# Patient Record
Sex: FEMALE | Race: WHITE
[De-identification: names, ages, dates, MRNs, and addresses within clinical notes are randomized per-mention and may not be internally consistent; named-entity substitution may affect disease eponyms.]

---

## 2020-04-26 ENCOUNTER — HOSPITAL ENCOUNTER (EMERGENCY)
Dept: HOSPITAL 56 - MW.ED | Age: 25
Discharge: HOME | End: 2020-04-26
Payer: COMMERCIAL

## 2020-04-26 VITALS — HEART RATE: 78 BPM | DIASTOLIC BLOOD PRESSURE: 90 MMHG | SYSTOLIC BLOOD PRESSURE: 130 MMHG

## 2020-04-26 DIAGNOSIS — M77.11: Primary | ICD-10-CM

## 2020-04-26 NOTE — EDM.PDOC
ED HPI GENERAL MEDICAL PROBLEM





- General


Chief Complaint: Upper Extremity Injury/Pain


Stated Complaint: RT ARM PAIN/SWELLING


Time Seen by Provider: 04/26/20 14:59


Source of Information: Reports: Patient


History Limitations: Reports: No Limitations





- History of Present Illness


INITIAL COMMENTS - FREE TEXT/NARRATIVE: 


HISTORY AND PHYSICAL:





History of present illness:


Patient is a 24-year-old female who presents to the emergency room with 

complaints of right elbow pain.  She states she started to have some pain just 

below her elbow and would shoot down into her third, fourth and fifth digit 

causing some intermittent tingling sensations.  She states she is concerned as 

she read in the news that COVID-19 cause blood clots (she does not have any 

symptoms of COVID nor exposure).  She would like to be evaluated for a blood 

clot in her elbow.  She denies any hormone replacement therapy, recent travel 

with long periods of sitting, or any noted areas of redness, warmth or soft 

tissue swelling.  She has no known hemological diseases.  Pain is aggravated 

with physical activity.  Patient denies any fever, chills, headache, change in 

vision, syncope or near syncope. Denies any chest pain, back pain, shortness of 

breath or cough. Denies any GI or  symptoms. Patient has been eating and 

drinking appropriately.  Nuys any chance of pregnancy.





Review of systems: 


As per history of present illness and below otherwise all systems reviewed and 

negative.





Past medical history: 


As per history of present illness and as reviewed below otherwise 

noncontributory.





Surgical history: 


As per history of present illness and as reviewed below otherwise 

noncontributory.





Social history: 


See social history for further information





Family history: 


As per history of present illness and as reviewed below otherwise 

noncontributory.





Physical exam:


General: Well developed and well nourished 24-year-old female.  Alert and 

oriented.  Nontoxic-appearing and in no acute distress.


HEENT: Atraumatic, normocephalic, pupils equal and reactive bilaterally, 

negative for conjunctival pallor or scleral icterus, mucous membranes moist, 

TMs normal bilaterally, throat clear, neck supple, nontender, trachea midline. 

No drooling or trismus noted. No meningeal signs. No hot potato voice noted. 


Lungs: Clear to auscultation, breath sounds equal bilaterally, chest nontender.


Heart: S1S2, regular rate and rhythm without overt murmur


Abdomen: Soft, nondistended, nontender. 


Skin: Intact, warm, dry. No lesions or rashes noted.


Extremities: Atraumatic, moves all extremities per self without difficulty or 

deficits, negative for cords or calf pain. SEE NOTES.  Radial pulse.  Cap 

refill less than 3 seconds.  Denies any numbness or tingling neurovascular 

unremarkable.


Neuro: Awake, alert, oriented. Cranial nerves II through XII unremarkable. 

Cerebellum unremarkable. Motor and sensory unremarkable throughout. Exam 

nonfocal.





Notes:


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  She 

does have pain with resisted wrist and digit extension.  Her physical exam is 

consistent with epicondylitis.  We discussed treatment options and the need for 

follow-up with primary care and or orthopedic provider.  Patient has low risk 

of blood clots, her vital signs are stable and physically examining her 

extremities-all within normal limits.  Did discuss signs and symptoms that 

would prompt her to return to the ED.  Supportive care measures were reviewed 

and discussed. Voices understanding and is agreeable to plan of care. Denies 

any further questions or concerns at this time.





Diagnostics:


None





Therapeutics:


None





Prescription:


Diclofenac





Impression: 


Epicondylitis, right





Plan:


1. Rest, ice, elevate the affected extremity. 


2. Tylenol as needed for pain management. Take the Diclofenac with FOOD as 

needed up to twice daily. Do not take any additional NSAIDs such as Ibuprofen 

or Aleve with this medication. 


3. Follow up with the Orthopedic provider as we discussed. Return to the ED as 

needed and as discussed.





Definitive disposition and diagnosis as appropriate pending reevaluation and 

review of above.





  ** Right Arm


Pain Score (Numeric/FACES): 3





- Related Data


 Allergies











Allergy/AdvReac Type Severity Reaction Status Date / Time


 


No Known Allergies Allergy   Verified 04/26/20 15:15











Home Meds: 


 Home Meds





Diclofenac Sodium [Voltaren] 75 mg PO BIDMEALS PRN #20 tab.cr 04/26/20 [Rx]











Past Medical History


OB/GYN History: Reports: Pregnancy





- Infectious Disease History


Infectious Disease History: Reports: Chicken Pox





- Past Surgical History


HEENT Surgical History: Reports: Myringotomy w Tube(s)


Female  Surgical History: Reports: Breast Implant





Social & Family History





- Family History


Oncologic: Reports: Leukemia





- Tobacco Use


Smoking Status *Q: Never Smoker


Second Hand Smoke Exposure: No





- Caffeine Use


Caffeine Use: Reports: None





- Recreational Drug Use


Recreational Drug Use: No





Review of Systems





- Review of Systems


Review Of Systems: Comprehensive ROS is negative, except as noted in HPI.





ED EXAM, GENERAL





- Physical Exam


Exam: See Below (See dictation)





Course





- Vital Signs


Last Recorded V/S: 


 Last Vital Signs











Temp  96.6 F L  04/26/20 15:14


 


Pulse  76   04/26/20 15:14


 


Resp  18   04/26/20 15:14


 


BP  139/95 H  04/26/20 15:14


 


Pulse Ox  98   04/26/20 15:14














Departure





- Departure


Time of Disposition: 15:28


Disposition: Home, Self-Care 01


Clinical Impression: 


 Epicondylitis








- Discharge Information


Prescriptions: 


Diclofenac Sodium [Voltaren] 75 mg PO BIDMEALS PRN #20 tab.cr


 PRN Reason: Pain


Instructions:  [facility]


Referrals: 


PCP,None [Primary Care Provider] - 


Forms:  ED Department Discharge


Additional Instructions: 


The following information is given to patients seen in the emergency department 

who are being discharged to home. This information is to outline your options 

for follow-up care. We provide all patients seen in our emergency department 

with a follow-up referral.





The need for follow-up, as well as the timing and circumstances, are variable 

depending upon the specifics of your emergency department visit.





If you don't have a primary care physician on staff, we will provide you with a 

referral. We always advise you to contact your personal physician following an 

emergency department visit to inform them of the circumstance of the visit and 

for follow-up with them and/or the need for any referrals to a consulting 

specialist.





The emergency department will also refer you to a specialist when appropriate. 

This referral assures that you have the opportunity for follow-up care with a 

specialist. All of these measure are taken in an effort to provide you with 

optimal care, which includes your follow-up.





Under all circumstances we always encourage you to contact your private 

physician who remains a resource for coordinating your care. When calling for 

follow-up care, please make the office aware that this follow-up is from your 

recent emergency room visit. If for any reason you are refused follow-up, 

please contact the [facility] Emergency 

Department at [phone] and asked to speak to the emergency department 

charge nurse.





[facility]


Primary Care


[address] [place] [address]


Phone: [phone]


Fax: [phone]





[facility]


[address] [place] [address]


Phone: [phone]


Fax: [phone]





1. Rest, ice, elevate the affected extremity. 


2. Tylenol as needed for pain management. Take the Diclofenac with FOOD as 

needed up to twice daily. Do not take any additional NSAIDs such as Ibuprofen 

or Aleve with this medication. 


3. Follow up with the Orthopedic provider as we discussed. Return to the ED as 

needed and as discussed.





Sepsis Event Note





- Evaluation


Sepsis Screening Result: No Definite Risk





- Focused Exam


Vital Signs: 


 Vital Signs











  Temp Pulse Resp BP Pulse Ox


 


 04/26/20 15:14  96.6 F L  76  18  139/95 H  98











Date Exam was Performed: 04/26/20


Time Exam was Performed: 15:39